# Patient Record
Sex: FEMALE | Race: WHITE | NOT HISPANIC OR LATINO | URBAN - METROPOLITAN AREA
[De-identification: names, ages, dates, MRNs, and addresses within clinical notes are randomized per-mention and may not be internally consistent; named-entity substitution may affect disease eponyms.]

---

## 2017-12-28 ENCOUNTER — ALLSCRIPTS OFFICE VISIT (OUTPATIENT)
Dept: OTHER | Facility: OTHER | Age: 22
End: 2017-12-28

## 2017-12-29 ENCOUNTER — LAB CONVERSION - ENCOUNTER (OUTPATIENT)
Dept: OTHER | Facility: OTHER | Age: 22
End: 2017-12-29

## 2017-12-29 LAB
ADDITIONAL INFORMATION (HISTORICAL): NORMAL
ADEQUACY: (HISTORICAL): NORMAL
COMMENT (HISTORICAL): NORMAL
CYTOTECHNOLOGIST: (HISTORICAL): NORMAL
INTERPRETATION (HISTORICAL): NORMAL
LMP (HISTORICAL): NORMAL
PREV. BX: (HISTORICAL): NORMAL
PREV. PAP (HISTORICAL): NORMAL
SOURCE (HISTORICAL): NORMAL

## 2018-01-02 NOTE — PROGRESS NOTES
Assessment   1  Encounter for routine gynecological examination with Papanicolaou smear of cervix     (V72 31,V76 2) (Z01 419)    Plan   Encounter for contraceptive management    · Junel FE 1/20 1-20 MG-MCG Oral Tablet; TAKE 1 TABLET DAILY   Rx By: Jason Baker; Dispense: 28 Days ; #:28 Tablet; Refill: 12; For: Encounter for contraceptive management; LUC = N; Verified Transmission to [facility] #[id number]; Last Updated By: System, [employer]; 12/28/2017 2:24:54 PM  Encounter for routine gynecological examination with Papanicolaou smear of cervix    · (1) THIN PREP PAP WITH IMAGING; Status: In Progress - Specimen/Data    Collected,Retrospective By Protocol Authorization;   Done: [id number]   Perform:[facility]; Order Comments:CervicalEndocervical; DFP:[id number]; Last Updated By:Mariaa Moyer; 12/28/2017 2:04:05 PM;Ordered;For:Encounter for routine gynecological examination with Papanicolaou smear of cervix; Ordered By:Triston Lofton; Maturation index required? : No  : 12/20/2017  HPV? : if ASCUS   · Follow-up visit in 1 year Evaluation and Treatment  Follow-up  Status: Hold For -    Scheduling  Requested for: 25Njs0978   Ordered; For: Encounter for routine gynecological examination with Papanicolaou smear of cervix; Ordered By: Jason Baker Performed:  Due: 01UOB6590    Discussion/Summary   healthy adult female Currently, she eats an adequate diet and has an adequate exercise regimen  the risks and benefits of cervical cancer screening were discussed Pap test was done today Breast cancer screening: self breast exam technique was taught, monthly self breast exam was advised and breast cancer screening is not indicated  Colorectal cancer screening: colorectal cancer screening is not indicated  Osteoporosis screening: bone mineral density testing is not indicated  Advice and education were given regarding nutrition, aerobic exercise, reproductive health and contraception  The patient has the current Goals:  To prevent pregnancy  The patent has the current Barriers: None  Patient is able to Self-Care  Chief Complaint   Pt presents for her yearly exam           History of Present Illness   HPI: The pt  denies having any current GYN problems  She continues to use OC's and prefers to remain on the same  GYN HM, Adult Female [facility]: The patient is being seen for a health maintenance and gynecology evaluation  The last health maintenance visit was 2 year(s) ago  General Health: The patient's health since the last visit is described as good  Lifestyle:  She consumes a diverse and healthy diet  -- She exercises regularly  -- She does not use tobacco       Reproductive health:  she reports no menstrual problems  Menstrual history: The cycles have been regular  The duration of her recent periods has been regular  -- she uses contraception  For contraception, she uses oral contraception pills  -- she is sexually active  Screening: cancer screening reviewed and current  Review of Systems        Constitutional: No fever, no chills, feels well, no tiredness, no recent weight gain or loss  Cardiovascular: no complaints of slow or fast heart rate, no chest pain, no palpitations, no leg claudication or lower extremity edema  Respiratory: no complaints of shortness of breath, no wheezing, no dyspnea on exertion, no orthopnea or PND  Breasts: no complaints of breast pain, breast lump or nipple discharge  Gastrointestinal: no complaints of abdominal pain, no constipation, no nausea or diarrhea, no vomiting, no bloody stools  Genitourinary: no complaints of dysuria, no incontinence, no pelvic pain, no dysmenorrhea, no vaginal discharge or abnormal vaginal bleeding  Integumentary: no complaints of skin rash or lesion, no itching or dry skin, no skin wounds  Neurological: no complaints of headache, no confusion, no numbness or tingling, no dizziness or fainting  Active Problems   1  Amenorrhea (626 0) (N91 2)   2  Contraception (V25 9) (Z30 9)   3  Encounter for contraceptive management (V25 9) (Z30 9)   4  Encounter for routine gynecological examination with Papanicolaou smear of cervix     (V72 31,V76 2) (Z01 419)    Past Medical History    · History of Denial Of Any Significant Medical History   · Encounter for contraceptive management (V25 9) (Z30 9)   · History of acute pharyngitis (V12 69) (Z87 09)     The active problems and past medical history were reviewed and updated today  Surgical History    · History of Adenoidectomy   · History of Tonsillectomy     The surgical history was reviewed and updated today  Family History   Mother    · No pertinent family history     The family history was reviewed and updated today  Social History    · Denied: History of Being A Social Drinker   · Never A Smoker   · Single   · Student  The social history was reviewed and is unchanged  Current Meds    1  EpiPen 2-Tj 0 3 MG/0 3ML Injection Solution Auto-injector; use as directed; Therapy: 09OVA0177 to (Last Rx:32Rgf2947)  Requested for: 16OHM0709 Ordered   2  Junel FE 1/20 1-20 MG-MCG Oral Tablet; TAKE 1 TABLET DAILY; Therapy: 36Bpq4755 to (Evaluate:19Jan2018)  Requested for: 96Spk0286; Last     Rx:57Ipt4331 Ordered   3  Minastrin 24 Fe 1-20 MG-MCG(24) Oral Tablet Chewable; Take 1 tablet daily; Therapy: 90MIA7979 to (Evaluate:32Kcn4423)  Requested for: 35Kii5459; Last     Rx:34Xlh3376 Ordered    Allergies   1  No Known Drug Allergies  2  Nuts    Vitals    Recorded: 75GFN8719 28:87UQ   Systolic 902   Diastolic 44   Height 5 ft 4 in   Weight 158 lb 6 oz   BMI Calculated 27 19   BSA Calculated 1 77   LMP [id number]     Physical Exam        Constitutional      General appearance: No acute distress, well appearing and well nourished  Neck      Neck: Normal, supple, trachea midline, no masses  Thyroid: Normal, no thyromegaly         Pulmonary      Respiratory effort: No increased work of breathing or signs of respiratory distress  Auscultation of lungs: Clear to auscultation  Cardiovascular      Auscultation of heart: Normal rate and rhythm, normal S1 and S2, no murmurs  Genitourinary      External genitalia: Normal and no lesions appreciated  Vagina: Normal, no lesions or dryness appreciated  Urethra: Normal        Urethral meatus: Normal        Bladder: Normal, soft, non-tender and no prolapse or masses appreciated  Cervix: Normal, no palpable masses  A Pap smear was performed  Uterus: Normal, non-tender, not enlarged, and no palpable masses  Adnexa/parametria: Normal, non-tender and no fullness or masses appreciated  Chest      Breasts: Normal and no dimpling or skin changes noted  Abdomen      Abdomen: Normal, non-tender, and no organomegaly noted  Liver and spleen: No hepatomegaly or splenomegaly  Examination for hernias: No hernias appreciated  Lymphatic      Palpation of lymph nodes in neck, axillae, groin and/or other locations: No lymphadenopathy or masses noted  Skin      Skin and subcutaneous tissue: Normal skin turgor and no rashes         Psychiatric      Orientation to person, place, and time: Normal        Mood and affect: Normal        Signatures    Electronically signed by : Coleen Macedo; Dec 28 2017  2:47PM EST                       (Author)     Electronically signed by : James Baird DO; Jan 1 2018  6:38PM EST

## 2018-01-15 NOTE — PROGRESS NOTES
Assessment    1  Encounter for preventive health examination (V70 0) (Z00 00)    Discussion/Summary    24 y/o woman with: Health maintenance exam and sports physical  Discussed various safety and health maintenance issues  Discussed return parameters  Follow-up yearly and PRN  Chief Complaint  Well exam for age with physical form  Patient needs Hepatitis A vaccine today  History of Present Illness  HPI: Patient is a 24 y/o woman who presents for annual well visit and sports physical for [employer]  Patient admits that she is feeling good and denies acute complaints  No family h/o early death  No chest pain, shortness of breath with exertion  Patient denies h/o concussion  Review of Systems    Constitutional: No fever, no chills, feels well, no tiredness, no recent weight gain or weight loss  Eyes: No complaints of eye pain, no red eyes, no eyesight problems, no discharge, no dry eyes, no itching of eyes  ENT: no complaints of earache, no loss of hearing, no nose bleeds, no nasal discharge, no sore throat, no hoarseness  Cardiovascular: No complaints of slow heart rate, no fast heart rate, no chest pain, no palpitations, no leg claudication, no lower extremity edema  Respiratory: No complaints of shortness of breath, no wheezing, no cough, no SOB on exertion, no orthopnea, no PND  Gastrointestinal: No complaints of abdominal pain, no constipation, no nausea or vomiting, no diarrhea, no bloody stools  Genitourinary: No complaints of dysuria, no incontinence, no pelvic pain, no dysmenorrhea, no vaginal discharge or bleeding  Musculoskeletal: No complaints of arthralgias, no myalgias, no joint swelling or stiffness, no limb pain or swelling  Integumentary: No complaints of skin rash or lesions, no itching, no skin wounds, no breast pain or lump     Neurological: No complaints of headache, no confusion, no convulsions, no numbness, no dizziness or fainting, no tingling, no limb weakness, no difficulty walking  Psychiatric: Not suicidal, no sleep disturbance, no anxiety or depression, no change in personality, no emotional problems  Endocrine: No complaints of proptosis, no hot flashes, no muscle weakness, no deepening of the voice, no feelings of weakness  Hematologic/Lymphatic: No complaints of swollen glands, no swollen glands in the neck, does not bleed easily, does not bruise easily  Active Problems    1  Amenorrhea (626 0) (N91 2)   2  Contraception (V25 9) (Z30 9)   3  Encounter for contraceptive management (V25 9) (Z30 9)   4  Encounter for routine gynecological examination with Papanicolaou smear of cervix   (V72 31,V76 2) (Z01 419)    Past Medical History    · History of Denial Of Any Significant Medical History   · Encounter for contraceptive management (V25 9) (Z30 9)   · History of acute pharyngitis (V12 69) (Z87 09)    Surgical History    · History of Adenoidectomy   · History of Tonsillectomy    Social History    · Denied: History of Being A Social Drinker   · Never A Smoker   · Single   · Student    Current Meds   1  EpiPen 2-Tj 0 3 MG/0 3ML Injection Solution Auto-injector; use as directed; Therapy: 48EKU2330 to (Last Rx:92Gtc0439)  Requested for: 65ZLS8994 Ordered   2  Minastrin 24 Fe 1-20 MG-MCG(24) Oral Tablet Chewable; Take 1 tablet daily; Therapy: 67EMR3130 to (Evaluate:16Xlh3613)  Requested for: 59Yig0946; Last   Rx:08Lsi6560 Ordered    Allergies    1  No Known Drug Allergies    2  Nuts    Vitals   Recorded: 73MOW6548 89:79AF   Systolic 258   Diastolic 64   Height 5 ft 4 8 in   Weight 149 lb 8 oz   BMI Calculated 25 03   BSA Calculated 1 74     Physical Exam    Constitutional   General appearance: No acute distress, well appearing and well nourished  Head and Face   Head and face: Normal     Palpation of the face and sinuses: No sinus tenderness  Eyes   Conjunctiva and lids: No swelling, erythema or discharge      Ears, Nose, Mouth, and Throat External inspection of ears and nose: Normal     Nasal mucosa, septum, and turbinates: Normal without edema or erythema  Lips, teeth, and gums: Normal, good dentition  Oropharynx: Normal with no erythema, edema, exudate or lesions  Neck   Neck: Supple, symmetric, trachea midline, no masses  Thyroid: Normal, no thyromegaly  Pulmonary   Respiratory effort: No increased work of breathing or signs of respiratory distress  Auscultation of lungs: Clear to auscultation  Cardiovascular   Auscultation of heart: Normal rate and rhythm, normal S1 and S2, no murmurs  Examination of extremities for edema and/or varicosities: Normal     Abdomen   Abdomen: Non-tender, no masses  Liver and spleen: No hepatomegaly or splenomegaly  Lymphatic   Palpation of lymph nodes in neck: No lymphadenopathy  Musculoskeletal   Gait and station: Normal     Digits and nails: Normal without clubbing or cyanosis  Joints, bones, and muscles: Normal     Skin   Skin and subcutaneous tissue: Normal without rashes or lesions  Psychiatric   Judgment and insight: Normal     Orientation to person, place, and time: Normal     Recent and remote memory: Intact      Mood and affect: Normal        Signatures   Electronically signed by : HENRY Gannon ; Aug  2 2016 12:23PM EST                       (Author)

## 2018-01-22 VITALS
WEIGHT: 158.38 LBS | HEIGHT: 64 IN | BODY MASS INDEX: 27.04 KG/M2 | SYSTOLIC BLOOD PRESSURE: 118 MMHG | DIASTOLIC BLOOD PRESSURE: 44 MMHG

## 2018-01-23 NOTE — RESULT NOTES
Verified Results  THINPREP TIS PAP REFLEX HPV mRNA E6/E7 67PNR8453 68:78RF Madilyn Scheuermann     Test Name Result Flag Reference   CLINICAL INFORMATION: None given     LMP: [id number]     PREV  PAP: NONE GIVEN     PREV  BX: NONE GIVEN     SOURCE: Cervix     STATEMENT OF ADEQUACY:      Satisfactory for evaluation  Endocervical/transformation zone component  present  INTERPRETATION/RESULT:      Negative for intraepithelial lesion or malignancy  COMMENT:      This Pap test has been evaluated with computer  assisted technology  CYTOTECHNOLOGIST:      GENARO TELLEZ(ASCP)  CT screening location: [facility] [address] 81 for contraceptive management    · Junel FE 1/20 1-20 MG-MCG Oral Tablet; TAKE 1 TABLET DAILY  Encounter for routine gynecological examination with Papanicolaou smear of cervix    · (1) THIN PREP PAP WITH IMAGING; Status: In Progress - Specimen/Data Collected;    Done: 54NRL9609  Maturation index required? : No  : 12/20/2017  HPV? : Not Requested   · Follow-up visit in 1 year Evaluation and Treatment  Follow-up  Status: Hold For -  Scheduling  Requested for: 25EIQ2989

## 2019-01-21 DIAGNOSIS — Z30.41 ENCOUNTER FOR SURVEILLANCE OF CONTRACEPTIVE PILLS: Primary | ICD-10-CM

## 2019-01-21 RX ORDER — EPINEPHRINE 0.3 MG/.3ML
INJECTION SUBCUTANEOUS
COMMUNITY
Start: 2014-08-04

## 2019-01-21 RX ORDER — NORETHINDRONE ACETATE AND ETHINYL ESTRADIOL 1MG-20(21)
1 KIT ORAL DAILY
COMMUNITY
Start: 2017-02-20 | End: 2019-01-21 | Stop reason: SDUPTHER

## 2019-01-21 RX ORDER — NORETHINDRONE ACETATE AND ETHINYL ESTRADIOL 1MG-20(21)
1 KIT ORAL DAILY
Qty: 28 TABLET | Refills: 1 | Status: SHIPPED | OUTPATIENT
Start: 2019-01-21 | End: 2019-03-13 | Stop reason: SDUPTHER

## 2019-03-13 DIAGNOSIS — Z30.41 ENCOUNTER FOR SURVEILLANCE OF CONTRACEPTIVE PILLS: ICD-10-CM

## 2019-03-20 RX ORDER — NORETHINDRONE ACETATE AND ETHINYL ESTRADIOL AND FERROUS FUMARATE 1MG-20(21)
KIT ORAL
Qty: 28 TABLET | Refills: 1 | Status: SHIPPED | OUTPATIENT
Start: 2019-03-20 | End: 2019-04-09 | Stop reason: SDUPTHER

## 2019-04-09 DIAGNOSIS — Z30.41 ENCOUNTER FOR SURVEILLANCE OF CONTRACEPTIVE PILLS: ICD-10-CM

## 2019-04-09 RX ORDER — NORETHINDRONE ACETATE AND ETHINYL ESTRADIOL AND FERROUS FUMARATE 1MG-20(21)
KIT ORAL
Qty: 28 TABLET | Refills: 0 | Status: SHIPPED | OUTPATIENT
Start: 2019-04-09